# Patient Record
Sex: MALE | Race: BLACK OR AFRICAN AMERICAN | NOT HISPANIC OR LATINO | Employment: FULL TIME | RURAL
[De-identification: names, ages, dates, MRNs, and addresses within clinical notes are randomized per-mention and may not be internally consistent; named-entity substitution may affect disease eponyms.]

---

## 2022-10-18 NOTE — PROGRESS NOTES
Subjective:  Donaldo Reis is a 57 y.o. male who presents for       Patient Active Problem List   Diagnosis   • Elevated blood pressure reading   • Chronic pain of right ankle   • Right leg swelling           Current Outpatient Medications:   •  insulin glargine (Semglee) 100 UNIT/ML injection, Inject 60 Units under the skin into the appropriate area as directed Every Night., Disp: , Rfl:   •  metFORMIN (GLUCOPHAGE) 500 MG tablet, Take 1 tablet by mouth 2 (Two) Times a Day With Meals., Disp: 60 tablet, Rfl: 3    PT is 58 yo male with management of obesity, DM type 2, sp appendectomy, PTSD    10/20/22 pt is here to establish. Pt is from [place] and served in  for 13 years. He moved back recently and now works at [employer] as a cook.  [facility]     He last saw a PCP in [place] earlier this year.   Pt has a CMH of obesity and is on insulin 60 units at bedtime along with metformin 500 mg PO BID.  His last hga1c was around the 5. He does not smoke tobacco no illicit drug use. He drinks alcohol occasionally.  He is  from his wife. He has 5 children.  He has a GF. His only surgery is an appendectomy. He does suffer from PTSD while [place]. He was in combat.  He served in [place]. He is not seeing mental health. No suicidal thoughts. Family medical history includes cancer. (brother  from cancer but pt does not know what time).. he has issues with right ankle swelling and this has been going on for past . He also has right leg swellling that has been going on for more than a year. He has tried to prop his legs when sleeping. He uses compression socks       Obesity  This is a chronic problem. The current episode started more than 1 year ago. The problem occurs constantly. The problem has been unchanged. Associated symptoms include arthralgias, fatigue, numbness and weakness. Pertinent negatives include no chest pain, chills, congestion, coughing, diaphoresis, fever, headaches, nausea, sore  throat or vomiting. Nothing aggravates the symptoms. He has tried nothing for the symptoms. The treatment provided no relief.   Diabetes  He presents for his follow-up diabetic visit. He has type 2 diabetes mellitus. Pertinent negatives for hypoglycemia include no confusion, dizziness, headaches, hunger, mood changes, nervousness/anxiousness, pallor, seizures, sleepiness or sweats. Associated symptoms include fatigue and weakness. Pertinent negatives for diabetes include no chest pain. Pertinent negatives for hypoglycemia complications include no blackouts, no hospitalization, no nocturnal hypoglycemia, no required assistance and no required glucagon injection. Pertinent negatives for diabetic complications include no autonomic neuropathy, CVA, heart disease, impotence, nephropathy, peripheral neuropathy, PVD or retinopathy. Risk factors for coronary artery disease include diabetes mellitus, male sex and obesity. Current diabetic treatment includes insulin injections and oral agent (monotherapy). He is compliant with treatment most of the time. His weight is stable. He is following a generally unhealthy diet. When asked about meal planning, he reported none. He has not had a previous visit with a dietitian. An ACE inhibitor/angiotensin II receptor blocker is not being taken. He does not see a podiatrist.Eye exam is not current.   Leg Swelling  This is a recurrent problem. The current episode started more than 1 month ago. The problem occurs constantly. The problem has been unchanged. Associated symptoms include arthralgias, fatigue, numbness and weakness. Pertinent negatives include no chest pain, chills, congestion, coughing, diaphoresis, fever, headaches, nausea, sore throat or vomiting. Nothing aggravates the symptoms. He has tried nothing for the symptoms. The treatment provided no relief.   Ankle Pain   The incident occurred more than 1 week ago. The incident occurred at home. The injury mechanism was a  twisting injury. The pain is present in the right ankle. The quality of the pain is described as aching. The pain is at a severity of 4/10. The pain is moderate. Associated symptoms include numbness. Pertinent negatives include no inability to bear weight, loss of motion, loss of sensation, muscle weakness or tingling. He reports no foreign bodies present. The symptoms are aggravated by movement and palpation. He has tried nothing for the symptoms. The treatment provided no relief.       Review of Systems  Review of Systems   Constitutional: Positive for activity change and fatigue. Negative for appetite change, chills, diaphoresis and fever.   HENT: Negative for congestion, postnasal drip, rhinorrhea, sinus pressure, sinus pain, sneezing, sore throat, trouble swallowing and voice change.    Respiratory: Negative for cough, choking, chest tightness, shortness of breath, wheezing and stridor.    Cardiovascular: Negative for chest pain.   Gastrointestinal: Negative for diarrhea, nausea and vomiting.   Genitourinary: Negative for impotence.   Musculoskeletal: Positive for arthralgias.   Skin: Negative for pallor.   Neurological: Positive for weakness and numbness. Negative for dizziness, tingling, seizures and headaches.   Psychiatric/Behavioral: Negative for confusion. The patient is not nervous/anxious.        Patient Active Problem List   Diagnosis   • Elevated blood pressure reading   • Chronic pain of right ankle   • Right leg swelling     Past Surgical History:   Procedure Laterality Date   • APPENDECTOMY       Social History     Socioeconomic History   • Marital status: Legally    Tobacco Use   • Smoking status: Never   • Smokeless tobacco: Never   Vaping Use   • Vaping Use: Never used   Substance and Sexual Activity   • Alcohol use: Yes   • Drug use: Never   • Sexual activity: Defer     Family History   Problem Relation Age of Onset   • Cancer Brother      No results found for any previous visit.     "  No image results found.    [unfilled]  Immunization History   Administered Date(s) Administered   • COVID-19 (MODERNA) 1st, 2nd, 3rd Dose Only 2022   • Tdap 10/30/2018       The following portions of the patient's history were reviewed and updated as appropriate: allergies, current medications, past family history, past medical history, past social history, past surgical history and problem list.        Physical Exam  /64 (BP Location: Right arm, Patient Position: Sitting, Cuff Size: Large Adult)   Pulse 102   Temp 97.8 °F (36.6 °C)   Ht 193 cm (76\")   Wt (!) 144 kg (316 lb 12.8 oz)   SpO2 98%   BMI 38.56 kg/m²     Physical Exam  Vitals and nursing note reviewed.   Constitutional:       Appearance: He is well-developed. He is not diaphoretic.   HENT:      Head: Normocephalic and atraumatic.      Right Ear: External ear normal.   Eyes:      Conjunctiva/sclera: Conjunctivae normal.      Pupils: Pupils are equal, round, and reactive to light.   Cardiovascular:      Rate and Rhythm: Normal rate and regular rhythm.      Heart sounds: Normal heart sounds. No murmur heard.  Pulmonary:      Effort: Pulmonary effort is normal. No respiratory distress.      Breath sounds: Normal breath sounds.   Abdominal:      General: Bowel sounds are normal. There is no distension.      Palpations: Abdomen is soft.      Tenderness: There is no abdominal tenderness.      Comments: Obese abdomen    Musculoskeletal:         General: Tenderness present. No deformity.      Cervical back: Normal range of motion and neck supple.      Right lower le+ Edema present.      Right ankle: Tenderness present. Decreased range of motion.   Skin:     General: Skin is warm.      Coloration: Skin is not pale.      Findings: No erythema or rash.   Neurological:      Mental Status: He is alert and oriented to person, place, and time.      Cranial Nerves: No cranial nerve deficit.   Psychiatric:         Behavior: Behavior normal. "         [unfilled]   Diagnosis Plan   1. Chronic pain of right ankle  XR Ankle 3+ View Right      2. Encounter for screening for cardiovascular disorders  CBC Auto Differential    Comprehensive Metabolic Panel    Hemoglobin A1c    Lipid Panel    TSH    T4, Free    Vitamin D,25-Hydroxy    Vitamin B12    Hepatitis C Antibody    Microalbumin / Creatinine Urine Ratio - Urine, Clean Catch      3. Encounter for screening for endocrine disorder  CBC Auto Differential    Comprehensive Metabolic Panel    Hemoglobin A1c    Lipid Panel    TSH    T4, Free    Vitamin D,25-Hydroxy    Vitamin B12    Hepatitis C Antibody    Microalbumin / Creatinine Urine Ratio - Urine, Clean Catch      4. Encounter for screening for diabetes mellitus  CBC Auto Differential    Comprehensive Metabolic Panel    Hemoglobin A1c    Lipid Panel    TSH    T4, Free    Vitamin D,25-Hydroxy    Vitamin B12    Hepatitis C Antibody    Microalbumin / Creatinine Urine Ratio - Urine, Clean Catch      5. Uncontrolled type 2 diabetes mellitus with hypoglycemia without coma (HCC)  CBC Auto Differential    Comprehensive Metabolic Panel    Hemoglobin A1c    Lipid Panel    TSH    T4, Free    Vitamin D,25-Hydroxy    Vitamin B12    Hepatitis C Antibody    Microalbumin / Creatinine Urine Ratio - Urine, Clean Catch      6. Need for hepatitis C screening test  CBC Auto Differential    Comprehensive Metabolic Panel    Hemoglobin A1c    Lipid Panel    TSH    T4, Free    Vitamin D,25-Hydroxy    Vitamin B12    Hepatitis C Antibody    Microalbumin / Creatinine Urine Ratio - Urine, Clean Catch      7. Special screening for malignant neoplasms, colon  Cologuard - Stool, Per Rectum      8. Class 2 severe obesity with serious comorbidity and body mass index (BMI) of 38.0 to 38.9 in adult, unspecified obesity type (HCC)        9. Right leg swelling  Duplex Venous Lower Extremity - Right CAR      10. Elevated blood pressure reading             -recommend labwork  -recommend  COVID-19 vaccination  -recommend colonoscopy screening - cologuard test instead.    -recommend tdap/shingles vaccinatin  -hep C screening - hep C antibody   -elevated blood pressure readings  - monitor BP at home. Advised to bring blood pressure readings along with get a BP machine. Recommend low sodium diet   -right ankle pain/right leg swelling - will get x-ray of right ankle along with venous doppler US. Consider PARVEZ Studies in the future. Consider Orthopedic referral   -obesity  -counseled weight loss >5 minutes bMI at 38.56   -Dm type 2 - check hga1c  On insulin 60 units at bedtime and metformin 500 mg pO BID   -advised pt to be safe and call with questions and concerns  -advised pt to go to ER or call 911 if symptoms worrisome or severe  -advised pt to followup with specialist and referrals  -advised pt to be safe during COVID-19 pandemic  I spent 45 minutes caring for Donaldo on this date of service. This time includes time spent by me in the following activities: preparing for the visit, reviewing tests, obtaining and/or reviewing a separately obtained history, performing a medically appropriate examination and/or evaluation, counseling and educating the patient/family/caregiver, ordering medications, tests, or procedures, referring and communicating with other health care professionals, documenting information in the medical record, independently interpreting results and communicating that information with the patient/family/caregiver and care coordination.         This document has been electronically signed by Josh Michel MD on October 20, 2022 09:01 CDT

## 2022-10-20 ENCOUNTER — OFFICE VISIT (OUTPATIENT)
Dept: FAMILY MEDICINE CLINIC | Facility: CLINIC | Age: 57
End: 2022-10-20

## 2022-10-20 VITALS
TEMPERATURE: 97.8 F | HEIGHT: 76 IN | HEART RATE: 102 BPM | DIASTOLIC BLOOD PRESSURE: 64 MMHG | WEIGHT: 315 LBS | BODY MASS INDEX: 38.36 KG/M2 | SYSTOLIC BLOOD PRESSURE: 142 MMHG | OXYGEN SATURATION: 98 %

## 2022-10-20 DIAGNOSIS — G89.29 CHRONIC PAIN OF RIGHT ANKLE: Primary | ICD-10-CM

## 2022-10-20 DIAGNOSIS — E11.649 UNCONTROLLED TYPE 2 DIABETES MELLITUS WITH HYPOGLYCEMIA WITHOUT COMA: ICD-10-CM

## 2022-10-20 DIAGNOSIS — R03.0 ELEVATED BLOOD PRESSURE READING: ICD-10-CM

## 2022-10-20 DIAGNOSIS — Z12.11 SPECIAL SCREENING FOR MALIGNANT NEOPLASMS, COLON: ICD-10-CM

## 2022-10-20 DIAGNOSIS — Z11.59 NEED FOR HEPATITIS C SCREENING TEST: ICD-10-CM

## 2022-10-20 DIAGNOSIS — E66.01 CLASS 2 SEVERE OBESITY WITH SERIOUS COMORBIDITY AND BODY MASS INDEX (BMI) OF 38.0 TO 38.9 IN ADULT, UNSPECIFIED OBESITY TYPE: ICD-10-CM

## 2022-10-20 DIAGNOSIS — Z13.29 ENCOUNTER FOR SCREENING FOR ENDOCRINE DISORDER: ICD-10-CM

## 2022-10-20 DIAGNOSIS — M79.89 RIGHT LEG SWELLING: ICD-10-CM

## 2022-10-20 DIAGNOSIS — Z13.6 ENCOUNTER FOR SCREENING FOR CARDIOVASCULAR DISORDERS: ICD-10-CM

## 2022-10-20 DIAGNOSIS — M25.571 CHRONIC PAIN OF RIGHT ANKLE: Primary | ICD-10-CM

## 2022-10-20 DIAGNOSIS — Z13.1 ENCOUNTER FOR SCREENING FOR DIABETES MELLITUS: ICD-10-CM

## 2022-10-20 PROCEDURE — 99204 OFFICE O/P NEW MOD 45 MIN: CPT | Performed by: FAMILY MEDICINE

## 2022-10-20 RX ORDER — INSULIN GLARGINE 100 [IU]/ML
60 INJECTION, SOLUTION SUBCUTANEOUS NIGHTLY
COMMUNITY
End: 2022-11-04 | Stop reason: SDUPTHER

## 2022-10-24 ENCOUNTER — PATIENT ROUNDING (BHMG ONLY) (OUTPATIENT)
Dept: FAMILY MEDICINE CLINIC | Facility: CLINIC | Age: 57
End: 2022-10-24

## 2022-10-24 ENCOUNTER — LAB (OUTPATIENT)
Dept: LAB | Facility: HOSPITAL | Age: 57
End: 2022-10-24

## 2022-10-24 DIAGNOSIS — Z13.1 ENCOUNTER FOR SCREENING FOR DIABETES MELLITUS: ICD-10-CM

## 2022-10-24 DIAGNOSIS — E11.649 UNCONTROLLED TYPE 2 DIABETES MELLITUS WITH HYPOGLYCEMIA WITHOUT COMA: ICD-10-CM

## 2022-10-24 DIAGNOSIS — Z13.29 ENCOUNTER FOR SCREENING FOR ENDOCRINE DISORDER: ICD-10-CM

## 2022-10-24 DIAGNOSIS — Z13.6 ENCOUNTER FOR SCREENING FOR CARDIOVASCULAR DISORDERS: ICD-10-CM

## 2022-10-24 DIAGNOSIS — Z11.59 NEED FOR HEPATITIS C SCREENING TEST: ICD-10-CM

## 2022-10-24 LAB
25(OH)D3 SERPL-MCNC: 32.8 NG/ML (ref 30–100)
ALBUMIN SERPL-MCNC: 4.1 G/DL (ref 3.5–5.2)
ALBUMIN/GLOB SERPL: 1.2 G/DL
ALP SERPL-CCNC: 90 U/L (ref 39–117)
ALT SERPL W P-5'-P-CCNC: 18 U/L (ref 1–41)
ANION GAP SERPL CALCULATED.3IONS-SCNC: 10 MMOL/L (ref 5–15)
AST SERPL-CCNC: 25 U/L (ref 1–40)
BASOPHILS # BLD AUTO: 0.06 10*3/MM3 (ref 0–0.2)
BASOPHILS NFR BLD AUTO: 0.5 % (ref 0–1.5)
BILIRUB SERPL-MCNC: 0.7 MG/DL (ref 0–1.2)
BUN SERPL-MCNC: 10 MG/DL (ref 6–20)
BUN/CREAT SERPL: 12.2 (ref 7–25)
CALCIUM SPEC-SCNC: 9.4 MG/DL (ref 8.6–10.5)
CHLORIDE SERPL-SCNC: 107 MMOL/L (ref 98–107)
CHOLEST SERPL-MCNC: 169 MG/DL (ref 0–200)
CO2 SERPL-SCNC: 23 MMOL/L (ref 22–29)
CREAT SERPL-MCNC: 0.82 MG/DL (ref 0.76–1.27)
DEPRECATED RDW RBC AUTO: 39.9 FL (ref 37–54)
EGFRCR SERPLBLD CKD-EPI 2021: 101.8 ML/MIN/1.73
EOSINOPHIL # BLD AUTO: 0.28 10*3/MM3 (ref 0–0.4)
EOSINOPHIL NFR BLD AUTO: 2.2 % (ref 0.3–6.2)
ERYTHROCYTE [DISTWIDTH] IN BLOOD BY AUTOMATED COUNT: 14.5 % (ref 12.3–15.4)
GLOBULIN UR ELPH-MCNC: 3.3 GM/DL
GLUCOSE SERPL-MCNC: 54 MG/DL (ref 65–99)
HBA1C MFR BLD: 5.8 % (ref 4.8–5.6)
HCT VFR BLD AUTO: 43 % (ref 37.5–51)
HCV AB SER DONR QL: NORMAL
HDLC SERPL-MCNC: 44 MG/DL (ref 40–60)
HGB BLD-MCNC: 14.1 G/DL (ref 13–17.7)
LDLC SERPL CALC-MCNC: 106 MG/DL (ref 0–100)
LDLC/HDLC SERPL: 2.38 {RATIO}
LYMPHOCYTES # BLD AUTO: 6.09 10*3/MM3 (ref 0.7–3.1)
LYMPHOCYTES NFR BLD AUTO: 48.1 % (ref 19.6–45.3)
MCH RBC QN AUTO: 25.1 PG (ref 26.6–33)
MCHC RBC AUTO-ENTMCNC: 32.8 G/DL (ref 31.5–35.7)
MCV RBC AUTO: 76.6 FL (ref 79–97)
MONOCYTES # BLD AUTO: 0.78 10*3/MM3 (ref 0.1–0.9)
MONOCYTES NFR BLD AUTO: 6.2 % (ref 5–12)
NEUTROPHILS NFR BLD AUTO: 42.7 % (ref 42.7–76)
NEUTROPHILS NFR BLD AUTO: 5.41 10*3/MM3 (ref 1.7–7)
PLATELET # BLD AUTO: 322 10*3/MM3 (ref 140–450)
PMV BLD AUTO: 10.3 FL (ref 6–12)
POTASSIUM SERPL-SCNC: 3.2 MMOL/L (ref 3.5–5.2)
PROT SERPL-MCNC: 7.4 G/DL (ref 6–8.5)
RBC # BLD AUTO: 5.61 10*6/MM3 (ref 4.14–5.8)
SODIUM SERPL-SCNC: 140 MMOL/L (ref 136–145)
T4 FREE SERPL-MCNC: 1.31 NG/DL (ref 0.93–1.7)
TRIGL SERPL-MCNC: 101 MG/DL (ref 0–150)
TSH SERPL DL<=0.05 MIU/L-ACNC: 1.32 UIU/ML (ref 0.27–4.2)
VIT B12 BLD-MCNC: 528 PG/ML (ref 211–946)
VLDLC SERPL-MCNC: 19 MG/DL (ref 5–40)
WBC NRBC COR # BLD: 12.66 10*3/MM3 (ref 3.4–10.8)

## 2022-10-24 PROCEDURE — 84439 ASSAY OF FREE THYROXINE: CPT

## 2022-10-24 PROCEDURE — 84443 ASSAY THYROID STIM HORMONE: CPT

## 2022-10-24 PROCEDURE — 85025 COMPLETE CBC W/AUTO DIFF WBC: CPT

## 2022-10-24 PROCEDURE — 82607 VITAMIN B-12: CPT

## 2022-10-24 PROCEDURE — 36415 COLL VENOUS BLD VENIPUNCTURE: CPT

## 2022-10-24 PROCEDURE — 82306 VITAMIN D 25 HYDROXY: CPT

## 2022-10-24 PROCEDURE — 83036 HEMOGLOBIN GLYCOSYLATED A1C: CPT

## 2022-10-24 PROCEDURE — 80053 COMPREHEN METABOLIC PANEL: CPT

## 2022-10-24 PROCEDURE — 86803 HEPATITIS C AB TEST: CPT

## 2022-10-24 PROCEDURE — 80061 LIPID PANEL: CPT

## 2022-10-24 NOTE — PROGRESS NOTES
October 24, 2022    Hello, may I speak with Donaldo Reis?    My name is AISHWARYA    I am  with [facility] - [address] [place] [address] [phone].    Before we get started may I verify your date of birth? 1965    I am calling to officially welcome you to our practice and ask about your recent visit. Is this a good time to talk?  CALLED NO ANSWER LEFT MESSAGE      Tell me about your visit with us. What things went well?        We're always looking for ways to make our patients' experiences even better. Do you have recommendations on ways we may improve?     Overall were you satisfied with your first visit to our practice?        I appreciate you taking the time to speak with me today. Is there anything else I can do for you?       Thank you, and have a great day.

## 2022-10-26 ENCOUNTER — LAB (OUTPATIENT)
Dept: LAB | Facility: HOSPITAL | Age: 57
End: 2022-10-26

## 2022-10-26 DIAGNOSIS — Z13.29 ENCOUNTER FOR SCREENING FOR ENDOCRINE DISORDER: ICD-10-CM

## 2022-10-26 DIAGNOSIS — Z13.1 ENCOUNTER FOR SCREENING FOR DIABETES MELLITUS: ICD-10-CM

## 2022-10-26 DIAGNOSIS — E11.649 UNCONTROLLED TYPE 2 DIABETES MELLITUS WITH HYPOGLYCEMIA WITHOUT COMA: ICD-10-CM

## 2022-10-26 DIAGNOSIS — Z11.59 NEED FOR HEPATITIS C SCREENING TEST: ICD-10-CM

## 2022-10-26 DIAGNOSIS — Z13.6 ENCOUNTER FOR SCREENING FOR CARDIOVASCULAR DISORDERS: ICD-10-CM

## 2022-10-26 PROCEDURE — 82043 UR ALBUMIN QUANTITATIVE: CPT

## 2022-10-26 PROCEDURE — 82570 ASSAY OF URINE CREATININE: CPT

## 2022-10-27 LAB
ALBUMIN UR-MCNC: <1.2 MG/DL
CREAT UR-MCNC: 111 MG/DL
MICROALBUMIN/CREAT UR: NORMAL MG/G{CREAT}

## 2022-11-02 ENCOUNTER — TELEPHONE (OUTPATIENT)
Dept: FAMILY MEDICINE CLINIC | Facility: CLINIC | Age: 57
End: 2022-11-02

## 2022-11-02 NOTE — TELEPHONE ENCOUNTER
----- Message from Josh Michel MD sent at 10/25/2022 11:32 AM CDT -----  Please call pt    On CBC WBC count at 12.66 and will need to monitor.  May be reactive    MCV low to suggest pt has small red blood cells. Possible iron deficiency anemia    Lipid panel shows LDL at 106 and recommend pt start on lipitor 20 mg PO qhs give 30 pills and 3 refills. Will help lower risk of cardiovascular disease    Hga1c stable at 5.80 continue good work with sugar control.      On CMP glucose low at 54. Have pt lower his in sulin to 55 unites from 60 units at bedtime. Recommend pt get glucose tabs OTC to help with hypoglycemic episodes    Potassium low at 3.2 and recommend pt start on potassium chloride K-DUR 20 MEQ PO BID for 7 days please give 14 pills and no refills. Recommend high potassium foods like bananas     Kidney and liver function stable    Recheck on next visit thanks

## 2022-11-02 NOTE — TELEPHONE ENCOUNTER
----- Message from Josh Michel MD sent at 10/28/2022  9:49 AM CDT -----  Please let pt know x-ray of right ankle shows old areas of avulsions/fracture of the middle portion of ankle  There is small bone spur in distal tibia area    Also has moderate sized heel spurs  Along with degenerative changes of the 5th metatarsal of 5th toe    Recommend podiatry referral and if pt agreeable I will put in referral  Recheck on next visit thanks

## 2022-11-02 NOTE — TELEPHONE ENCOUNTER
Gave pt results and recommendations. He voiced understanding and is agreeable to referral to Podiatry but would like to wait on medications and discuss at upcoming appointment.

## 2022-11-03 DIAGNOSIS — M25.571 RIGHT ANKLE PAIN, UNSPECIFIED CHRONICITY: Primary | ICD-10-CM

## 2022-11-03 DIAGNOSIS — M79.671 RIGHT FOOT PAIN: ICD-10-CM

## 2022-11-04 ENCOUNTER — OFFICE VISIT (OUTPATIENT)
Dept: FAMILY MEDICINE CLINIC | Facility: CLINIC | Age: 57
End: 2022-11-04

## 2022-11-04 VITALS
OXYGEN SATURATION: 98 % | SYSTOLIC BLOOD PRESSURE: 150 MMHG | HEART RATE: 78 BPM | BODY MASS INDEX: 38.36 KG/M2 | DIASTOLIC BLOOD PRESSURE: 88 MMHG | WEIGHT: 315 LBS | HEIGHT: 76 IN | TEMPERATURE: 97.3 F

## 2022-11-04 DIAGNOSIS — E11.8 CONTROLLED TYPE 2 DIABETES MELLITUS WITH COMPLICATION, WITH LONG-TERM CURRENT USE OF INSULIN: ICD-10-CM

## 2022-11-04 DIAGNOSIS — E66.01 CLASS 2 SEVERE OBESITY DUE TO EXCESS CALORIES WITH SERIOUS COMORBIDITY AND BODY MASS INDEX (BMI) OF 38.0 TO 38.9 IN ADULT: ICD-10-CM

## 2022-11-04 DIAGNOSIS — Z79.4 CONTROLLED TYPE 2 DIABETES MELLITUS WITH COMPLICATION, WITH LONG-TERM CURRENT USE OF INSULIN: ICD-10-CM

## 2022-11-04 DIAGNOSIS — M19.071 ARTHRITIS OF RIGHT ANKLE: ICD-10-CM

## 2022-11-04 DIAGNOSIS — S82.891G CLOSED AVULSION FRACTURE OF RIGHT ANKLE WITH DELAYED HEALING, SUBSEQUENT ENCOUNTER: ICD-10-CM

## 2022-11-04 DIAGNOSIS — G89.29 CHRONIC PAIN OF RIGHT ANKLE: Primary | ICD-10-CM

## 2022-11-04 DIAGNOSIS — E78.2 MIXED HYPERLIPIDEMIA: ICD-10-CM

## 2022-11-04 DIAGNOSIS — D72.829 LEUKOCYTOSIS, UNSPECIFIED TYPE: ICD-10-CM

## 2022-11-04 DIAGNOSIS — M25.571 CHRONIC PAIN OF RIGHT ANKLE: Primary | ICD-10-CM

## 2022-11-04 PROBLEM — S82.899G: Status: ACTIVE | Noted: 2022-11-04

## 2022-11-04 PROCEDURE — 99214 OFFICE O/P EST MOD 30 MIN: CPT | Performed by: FAMILY MEDICINE

## 2022-11-04 RX ORDER — INSULIN GLARGINE 300 U/ML
55 INJECTION, SOLUTION SUBCUTANEOUS DAILY
Qty: 9 ML | Refills: 3 | Status: SHIPPED | OUTPATIENT
Start: 2022-11-04 | End: 2022-11-09 | Stop reason: CLARIF

## 2022-11-04 RX ORDER — ATORVASTATIN CALCIUM 20 MG/1
20 TABLET, FILM COATED ORAL DAILY
Qty: 90 TABLET | Refills: 3 | Status: SHIPPED | OUTPATIENT
Start: 2022-11-04 | End: 2022-11-22 | Stop reason: SDUPTHER

## 2022-11-04 RX ORDER — POTASSIUM CHLORIDE 750 MG/1
10 TABLET, FILM COATED, EXTENDED RELEASE ORAL 2 TIMES DAILY
Qty: 14 TABLET | Refills: 0 | Status: SHIPPED | OUTPATIENT
Start: 2022-11-04 | End: 2022-11-11

## 2022-11-04 RX ORDER — INSULIN GLARGINE 100 [IU]/ML
55 INJECTION, SOLUTION SUBCUTANEOUS NIGHTLY
Qty: 9 ML | Refills: 3 | Status: SHIPPED | OUTPATIENT
Start: 2022-11-04 | End: 2022-11-04

## 2022-11-04 RX ORDER — LOSARTAN POTASSIUM 50 MG/1
50 TABLET ORAL DAILY
Qty: 30 TABLET | Refills: 3 | Status: SHIPPED | OUTPATIENT
Start: 2022-11-04 | End: 2022-11-22 | Stop reason: SDUPTHER

## 2022-11-04 NOTE — PATIENT INSTRUCTIONS
Try 50-55 units of insulin at bedtime instead of 60  switch from lantus to toujeo insulin.     New medications    Lipitor 20 mg at beditme for cholesteorl    Losartan 50 mg daily for blood pressure.  Please get blood pressure machined and bring readings on next visit goal <130/90    Can try trulicity 0.75 mg injection weekly. 1 if approved stop insulin and try this medication instead     If not approved continue with insulin.      Recheck in 6-8 weeks

## 2022-11-08 NOTE — TELEPHONE ENCOUNTER
Alley from [facility] pharmacy called in regards to patient's glargine. She said typically patient gets the vials instead of pens so they wanted to clarify before they filled prescription.  Call back number: [phone]

## 2022-11-09 NOTE — TELEPHONE ENCOUNTER
Josh Michel MD  You 41 minutes ago (6:59 AM)     Please change medication to Lantus and I will sign. Thank you

## 2022-11-22 RX ORDER — ATORVASTATIN CALCIUM 20 MG/1
20 TABLET, FILM COATED ORAL DAILY
Qty: 90 TABLET | Refills: 0 | Status: SHIPPED | OUTPATIENT
Start: 2022-11-22 | End: 2023-02-22 | Stop reason: SDUPTHER

## 2022-11-22 RX ORDER — LOSARTAN POTASSIUM 50 MG/1
50 TABLET ORAL DAILY
Qty: 90 TABLET | Refills: 0 | Status: SHIPPED | OUTPATIENT
Start: 2022-11-22 | End: 2023-02-22 | Stop reason: SDUPTHER

## 2022-11-22 NOTE — TELEPHONE ENCOUNTER
Rx Refill Note  Requested Prescriptions     Pending Prescriptions Disp Refills   • Insulin Glargine (LANTUS SOLOSTAR) 100 UNIT/ML injection pen 15 mL 3     Sig: Inject 55 Units under the skin into the appropriate area as directed Daily.   • atorvastatin (Lipitor) 20 MG tablet 90 tablet 0     Sig: Take 1 tablet by mouth Daily.   • losartan (Cozaar) 50 MG tablet 30 tablet 3     Sig: Take 1 tablet by mouth Daily.   • Insulin Pen Needle 32G X 8 MM misc 100 each 3     Si application Daily.      Last office visit with prescribing clinician: 2022      Next office visit with prescribing clinician: 2022   {TIP  Encounters:       ARB Protocol Failed 2022 08:22 AM   Protocol Details  Normal serum potassium on file within the past year    BP under 130/80 in past year and patient has diabetes, CAD or PVD            {TIP  Please add Last Relevant Lab Date if appropriate  {TIP  Is Refill Pharmacy correct? yes  Glenn Rosas LPN  22, 09:02 CST

## 2022-11-22 NOTE — TELEPHONE ENCOUNTER
PLEASE CALL PATIENT REGARDING THESE REFILLS. THE PHARMACY SAID THAT HE CANT GET ANY UNTIL NEXT MONTH AND [facility] PAT[facility]ENTS THE PHONE NUMBER [phone]

## 2023-02-19 PROBLEM — I10 ESSENTIAL HYPERTENSION: Status: ACTIVE | Noted: 2023-02-19

## 2023-02-22 ENCOUNTER — OFFICE VISIT (OUTPATIENT)
Dept: FAMILY MEDICINE CLINIC | Facility: CLINIC | Age: 58
End: 2023-02-22
Payer: OTHER GOVERNMENT

## 2023-02-22 VITALS
OXYGEN SATURATION: 97 % | DIASTOLIC BLOOD PRESSURE: 72 MMHG | HEIGHT: 76 IN | WEIGHT: 315 LBS | SYSTOLIC BLOOD PRESSURE: 134 MMHG | HEART RATE: 78 BPM | BODY MASS INDEX: 38.36 KG/M2 | TEMPERATURE: 97.9 F

## 2023-02-22 DIAGNOSIS — E11.9 CONTROLLED TYPE 2 DIABETES MELLITUS WITHOUT COMPLICATION, WITHOUT LONG-TERM CURRENT USE OF INSULIN: Primary | ICD-10-CM

## 2023-02-22 DIAGNOSIS — E66.01 CLASS 2 SEVERE OBESITY WITH SERIOUS COMORBIDITY AND BODY MASS INDEX (BMI) OF 39.0 TO 39.9 IN ADULT, UNSPECIFIED OBESITY TYPE: ICD-10-CM

## 2023-02-22 DIAGNOSIS — I10 ESSENTIAL HYPERTENSION: ICD-10-CM

## 2023-02-22 DIAGNOSIS — E78.2 MIXED HYPERLIPIDEMIA: ICD-10-CM

## 2023-02-22 PROCEDURE — 99214 OFFICE O/P EST MOD 30 MIN: CPT | Performed by: FAMILY MEDICINE

## 2023-02-22 RX ORDER — INSULIN GLARGINE-YFGN 100 [IU]/ML
55 INJECTION, SOLUTION SUBCUTANEOUS DAILY
Qty: 9 ML | Refills: 12 | Status: SHIPPED | OUTPATIENT
Start: 2023-02-22 | End: 2023-03-10 | Stop reason: SDUPTHER

## 2023-02-22 RX ORDER — INSULIN GLARGINE-YFGN 100 [IU]/ML
55 INJECTION, SOLUTION SUBCUTANEOUS DAILY
Qty: 9 ML | Refills: 12 | Status: SHIPPED | OUTPATIENT
Start: 2023-02-22 | End: 2023-02-22 | Stop reason: SDUPTHER

## 2023-02-22 RX ORDER — LOSARTAN POTASSIUM 50 MG/1
50 TABLET ORAL DAILY
Qty: 90 TABLET | Refills: 0 | Status: SHIPPED | OUTPATIENT
Start: 2023-02-22 | End: 2023-02-22 | Stop reason: SDUPTHER

## 2023-02-22 RX ORDER — LOSARTAN POTASSIUM 50 MG/1
50 TABLET ORAL DAILY
Qty: 90 TABLET | Refills: 1 | Status: SHIPPED | OUTPATIENT
Start: 2023-02-22

## 2023-02-22 RX ORDER — ATORVASTATIN CALCIUM 20 MG/1
20 TABLET, FILM COATED ORAL DAILY
Qty: 90 TABLET | Refills: 1 | Status: SHIPPED | OUTPATIENT
Start: 2023-02-22

## 2023-02-22 RX ORDER — ATORVASTATIN CALCIUM 20 MG/1
20 TABLET, FILM COATED ORAL DAILY
Qty: 90 TABLET | Refills: 0 | Status: SHIPPED | OUTPATIENT
Start: 2023-02-22 | End: 2023-02-22 | Stop reason: SDUPTHER

## 2023-03-09 PROBLEM — E11.9 CONTROLLED TYPE 2 DIABETES MELLITUS WITHOUT COMPLICATION, WITHOUT LONG-TERM CURRENT USE OF INSULIN: Status: ACTIVE | Noted: 2023-03-09

## 2023-03-10 RX ORDER — INSULIN GLARGINE-YFGN 100 [IU]/ML
55 INJECTION, SOLUTION SUBCUTANEOUS DAILY
Qty: 9 ML | Refills: 1 | Status: SHIPPED | OUTPATIENT
Start: 2023-03-10 | End: 2023-03-17 | Stop reason: CLARIF

## 2023-03-17 NOTE — TELEPHONE ENCOUNTER
Pt stated he received vials of insulin and he will not use those. He is requesting that pens be sent in to the [facility] pharmacy. He stated Lantus was his previous insulin before Semglee.

## 2023-04-12 NOTE — PROGRESS NOTES
Subjective:  Donaldo Reis is a 58 y.o. male who presents for       Patient Active Problem List   Diagnosis   • Elevated blood pressure reading   • Chronic pain of right ankle   • Right leg swelling   • Arthritis of right ankle   • Closed avulsion fracture of ankle with delayed healing   • Controlled type 2 diabetes mellitus with complication, with long-term current use of insulin   • Mixed hyperlipidemia   • Leukocytosis   • Essential hypertension   • Class 2 severe obesity with serious comorbidity and body mass index (BMI) of 39.0 to 39.9 in adult   • Controlled type 2 diabetes mellitus without complication, without long-term current use of insulin           Current Outpatient Medications:   •  atorvastatin (Lipitor) 20 MG tablet, Take 1 tablet by mouth Daily., Disp: 90 tablet, Rfl: 1  •  Insulin Glargine (LANTUS SOLOSTAR) 100 UNIT/ML injection pen, Inject 55 Units under the skin into the appropriate area as directed Daily., Disp: 9 mL, Rfl: 3  •  Insulin Pen Needle 32G X 8 MM misc, 1 application Daily., Disp: 100 each, Rfl: 12  •  losartan (Cozaar) 50 MG tablet, Take 1 tablet by mouth Daily., Disp: 90 tablet, Rfl: 1  •  metFORMIN (GLUCOPHAGE) 500 MG tablet, Take 1 tablet by mouth 2 (Two) Times a Day With Meals., Disp: 180 tablet, Rfl: 1  •  Semaglutide-Weight Management (Wegovy) 0.25 MG/0.5ML solution auto-injector, Inject 0.25 mg under the skin into the appropriate area as directed 1 (One) Time Per Week., Disp: 2 mL, Rfl: 3       PT is 56 yo male with management of obesity, DM type 2, sp appendectomy, PTSD, HLP, HTN     11/4/22 in office visit for recheck. Pt had labwork done on 10/24/22 that showed stable thyroid studies. Vitamin D and b12 stable lipid panel showed LDL at 106. hga1c at 5.80. CMP showed potassium at 3.2 glucose at 5. CBC Showed WBC at 12.66 with elevations of lymphocytes.  Hemoglobin stable but MCV low at 76.  Pt was advised to cut back on his insulin from 60 to 55 units at bedtime. Pt had  x-ray of ankle on 10/26/22 that showed 8 mm old avulsions of tip of medial malleolus, along with small spur at posterior aspect of distal tibia and moderate sized calcaneal spurs.  There is minimal degenerative changes of base of 5th metatarsal. Pt was referred to Podiatry.  He states he is doing fair.  Has athlete's foot on left foot and has tried OTC medications with no relief. It is burning     2/22/23 in office visit for recheck. Pt is due for new labwork, BP better controlled with losartan 50 mg daily. Pt is doing well no chest pain no dizziness. He continues to take long acting insulin 55 units at bedtime.       5/17/23 in office visit for recheck. Pt has yet to get labwork ordered on 219/23 .he has yet to complete cologuard      Hypertension  This is a chronic problem. The current episode started more than 1 year ago. The problem has been gradually improving since onset. The problem is uncontrolled. Pertinent negatives include no chest pain, headaches or shortness of breath. Past treatments include angiotensin blockers. Current antihypertension treatment includes angiotensin blockers. The current treatment provides no improvement. There are no compliance problems.  There is no history of angina, kidney disease, CAD/MI, CVA, heart failure, left ventricular hypertrophy, PVD or retinopathy. There is no history of chronic renal disease, coarctation of the aorta, hyperaldosteronism, a hypertension causing med, pheochromocytoma or renovascular disease.   Obesity  This is a chronic problem. The current episode started more than 1 year ago. The problem occurs constantly. The problem has been unchanged. Associated symptoms include arthralgias, fatigue, numbness and weakness. Pertinent negatives include no chest pain, chills, congestion, coughing, diaphoresis, fever, headaches, nausea, sore throat or vomiting. Nothing aggravates the symptoms. He has tried nothing for the symptoms. The treatment provided no relief.    Diabetes  He presents for his follow-up diabetic visit. He has type 2 diabetes mellitus. Pertinent negatives for hypoglycemia include no confusion, dizziness, headaches, hunger, mood changes, nervousness/anxiousness, pallor, seizures, sleepiness or sweats. Associated symptoms include fatigue and weakness. Pertinent negatives for diabetes include no chest pain. Pertinent negatives for hypoglycemia complications include no blackouts, no hospitalization, no nocturnal hypoglycemia, no required assistance and no required glucagon injection. Pertinent negatives for diabetic complications include no autonomic neuropathy, CVA, heart disease, impotence, nephropathy, peripheral neuropathy, PVD or retinopathy. Risk factors for coronary artery disease include diabetes mellitus, male sex and obesity. Current diabetic treatment includes insulin injections and oral agent (monotherapy). He is compliant with treatment most of the time. His weight is stable. He is following a generally unhealthy diet. When asked about meal planning, he reported none. He has not had a previous visit with a dietitian. An ACE inhibitor/angiotensin II receptor blocker is not being taken. He does not see a podiatrist.Eye exam is not current.   Leg Swelling  This is a recurrent problem. The current episode started more than 1 month ago. The problem occurs constantly. The problem has been unchanged. Associated symptoms include arthralgias, fatigue, numbness and weakness. Pertinent negatives include no chest pain, chills, congestion, coughing, diaphoresis, fever, headaches, nausea, sore throat or vomiting. Nothing aggravates the symptoms. He has tried nothing for the symptoms. The treatment provided no relief.   Ankle Pain   The incident occurred more than 1 week ago. The incident occurred at home. The injury mechanism was a twisting injury. The pain is present in the right ankle. The quality of the pain is described as aching. The pain is at a severity of  4/10. The pain is moderate. Associated symptoms include numbness. Pertinent negatives include no inability to bear weight, loss of motion, loss of sensation, muscle weakness or tingling. He reports no foreign bodies present. The symptoms are aggravated by movement and palpation. He has tried nothing for the symptoms. The treatment provided no relief.       Review of Systems  Review of Systems   Constitutional: Positive for activity change and fatigue. Negative for appetite change, chills, diaphoresis and fever.   HENT: Negative for congestion, postnasal drip, rhinorrhea, sinus pressure, sinus pain, sneezing, sore throat, trouble swallowing and voice change.    Respiratory: Negative for cough, choking, chest tightness, shortness of breath, wheezing and stridor.    Cardiovascular: Negative for chest pain.   Gastrointestinal: Negative for diarrhea, nausea and vomiting.   Musculoskeletal: Positive for arthralgias. Negative for back pain.   Neurological: Positive for weakness and numbness. Negative for headaches.       Patient Active Problem List   Diagnosis   • Elevated blood pressure reading   • Chronic pain of right ankle   • Right leg swelling   • Arthritis of right ankle   • Closed avulsion fracture of ankle with delayed healing   • Controlled type 2 diabetes mellitus with complication, with long-term current use of insulin   • Mixed hyperlipidemia   • Leukocytosis   • Essential hypertension   • Class 2 severe obesity with serious comorbidity and body mass index (BMI) of 39.0 to 39.9 in adult   • Controlled type 2 diabetes mellitus without complication, without long-term current use of insulin     Past Surgical History:   Procedure Laterality Date   • APPENDECTOMY       Social History     Socioeconomic History   • Marital status: Legally    Tobacco Use   • Smoking status: Never   • Smokeless tobacco: Never   Vaping Use   • Vaping Use: Never used   Substance and Sexual Activity   • Alcohol use: Yes   • Drug  "use: Never   • Sexual activity: Defer     Family History   Problem Relation Age of Onset   • Cancer Brother      No visits with results within 6 Month(s) from this visit.   Latest known visit with results is:   Lab on 10/26/2022   Component Date Value Ref Range Status   • Microalbumin/Creatinine Ratio 10/26/2022    Final    Unable to calculate   • Creatinine, Urine 10/26/2022 111.0  mg/dL Final   • Microalbumin, Urine 10/26/2022 <1.2  mg/dL Final      XR Ankle 3+ View Right  Narrative: Three view right ankle    HISTORY: Pain    AP, lateral and oblique views obtained.    COMPARISON: None    FINDINGS:   No acute fracture or dislocation.  8 mm and smaller old avulsions tip of the medial malleolus.  Small spur posterior aspect distal tibia.  Moderate-sized calcaneal spurs.  Minimal degenerative change base of the fifth metatarsal.  No other osseous or articular abnormality.  Impression: CONCLUSION:  8 mm and smaller old avulsions tip of the medial malleolus.  Small spur posterior aspect distal tibia.  Moderate-sized calcaneal spurs.  Minimal degenerative change base of the fifth metatarsal.    [id number]    Electronically signed by:  Art Agrawal MD  10/27/2022 10:50 PM  CDT Workstation: [id number]    @[email or web address]@  Immunization History   Administered Date(s) Administered   • COVID-19 (MODERNA) 1st,2nd,3rd Dose Monovalent 04/18/2022   • Tdap 10/30/2018       The following portions of the patient's history were reviewed and updated as appropriate: allergies, current medications, past family history, past medical history, past social history, past surgical history and problem list.        Physical Exam  /84 (BP Location: Left arm, Patient Position: Sitting, Cuff Size: Large Adult)   Pulse 74   Temp 98.3 °F (36.8 °C)   Ht 193 cm (76\")   Wt (!) 146 kg (322 lb 6.4 oz)   SpO2 97%   BMI 39.24 kg/m²         Physical Exam  Vitals and nursing note reviewed.   Constitutional:       Appearance: He is well-developed. He is not " diaphoretic.   HENT:      Head: Normocephalic and atraumatic.      Right Ear: External ear normal.   Eyes:      Conjunctiva/sclera: Conjunctivae normal.      Pupils: Pupils are equal, round, and reactive to light.   Cardiovascular:      Rate and Rhythm: Normal rate and regular rhythm.      Heart sounds: Normal heart sounds. No murmur heard.  Pulmonary:      Effort: Pulmonary effort is normal. No respiratory distress.      Breath sounds: Normal breath sounds.   Abdominal:      General: Bowel sounds are normal. There is no distension.      Palpations: Abdomen is soft.      Tenderness: There is no abdominal tenderness.      Comments: Obese abdomen    Musculoskeletal:         General: Tenderness present. No deformity. Normal range of motion.      Cervical back: Normal range of motion and neck supple.   Skin:     General: Skin is warm.      Coloration: Skin is not pale.      Findings: No erythema or rash.   Neurological:      Mental Status: He is alert and oriented to person, place, and time.      Cranial Nerves: No cranial nerve deficit.   Psychiatric:         Behavior: Behavior normal.         [unfilled]   Diagnosis Plan   1. Encounter for diabetes type 2 eye exam  Ambulatory Referral for Diabetic Eye Exam-Optometry      2. Essential hypertension        3. Mixed hyperlipidemia        4. Controlled type 2 diabetes mellitus without complication, without long-term current use of insulin        5. Leukocytosis, unspecified type        6. Class 2 severe obesity with serious comorbidity and body mass index (BMI) of 39.0 to 39.9 in adult, unspecified obesity type        7. Special screening for malignant neoplasms, colon  Cologuard - Stool, Per Rectum           -recommend labwork  -recommend COVID-19 vaccination  -recommend diabetic eye exam- -refer to DR. Hernandes.    -recommend colonoscopy screening - will get cologuard.    -HTN - on losartan BP better controlled   -leukocytosis - continue to monitor   -HLP - -recommend  heart healthy diet. on lipitor 20 mg PO qhs. Drug information provided.   -right ankle pain/right leg swelling/old avulsion fracture of right ankle. Arthritis of right ankle  - reviewed x-ray of ankle. Referred to Podiatry  -obesity  -counseled weight loss >5 minutes bMI at 39.24  Start on wegovy injection 0.25 mg weekly.   -Dm type 2 -  On Latnus  55 units at bedtime   and metformin 500 mg pO BID.  May need to adjust insulin   -advised pt to be safe and call with questions and concerns  -advised pt to go to ER or call 911 if symptoms worrisome or severe  -advised pt to followup with specialist and referrals  -advised pt to be safe during COVID-19 pandemic  I spent 36  minutes caring for Donaldo on this date of service. This time includes time spent by me in the following activities: preparing for the visit, reviewing tests, obtaining and/or reviewing a separately obtained history, performing a medically appropriate examination and/or evaluation, counseling and educating the patient/family/caregiver, ordering medications, tests, or procedures, referring and communicating with other health care professionals, documenting information in the medical record, independently interpreting results and communicating that information with the patient/family/caregiver and care coordination.   -recheck in 4-6 weeks         This document has been electronically signed by Josh Michel MD on May 17, 2023 08:48 CDT

## 2023-04-20 ENCOUNTER — TELEPHONE (OUTPATIENT)
Dept: FAMILY MEDICINE CLINIC | Facility: CLINIC | Age: 58
End: 2023-04-20
Payer: OTHER GOVERNMENT

## 2023-04-20 NOTE — TELEPHONE ENCOUNTER
Patient called back needing his insulin to be sent to [facility]. He is also requesting a 90 day supply. He states he is also on his last pen.    Call back number [phone]

## 2023-04-20 NOTE — TELEPHONE ENCOUNTER
Patient called and would like a return call regarding his insulin prescription that will run out before he returns home

## 2023-05-17 ENCOUNTER — OFFICE VISIT (OUTPATIENT)
Dept: FAMILY MEDICINE CLINIC | Facility: CLINIC | Age: 58
End: 2023-05-17
Payer: OTHER GOVERNMENT

## 2023-05-17 VITALS
WEIGHT: 315 LBS | HEART RATE: 74 BPM | TEMPERATURE: 98.3 F | HEIGHT: 76 IN | BODY MASS INDEX: 38.36 KG/M2 | SYSTOLIC BLOOD PRESSURE: 130 MMHG | OXYGEN SATURATION: 97 % | DIASTOLIC BLOOD PRESSURE: 84 MMHG

## 2023-05-17 DIAGNOSIS — E11.9 ENCOUNTER FOR DIABETES TYPE 2 EYE EXAM: Primary | ICD-10-CM

## 2023-05-17 DIAGNOSIS — I10 ESSENTIAL HYPERTENSION: ICD-10-CM

## 2023-05-17 DIAGNOSIS — D72.829 LEUKOCYTOSIS, UNSPECIFIED TYPE: ICD-10-CM

## 2023-05-17 DIAGNOSIS — Z12.11 SPECIAL SCREENING FOR MALIGNANT NEOPLASMS, COLON: ICD-10-CM

## 2023-05-17 DIAGNOSIS — E11.9 CONTROLLED TYPE 2 DIABETES MELLITUS WITHOUT COMPLICATION, WITHOUT LONG-TERM CURRENT USE OF INSULIN: ICD-10-CM

## 2023-05-17 DIAGNOSIS — E66.01 CLASS 2 SEVERE OBESITY WITH SERIOUS COMORBIDITY AND BODY MASS INDEX (BMI) OF 39.0 TO 39.9 IN ADULT, UNSPECIFIED OBESITY TYPE: ICD-10-CM

## 2023-05-17 DIAGNOSIS — Z01.00 ENCOUNTER FOR DIABETES TYPE 2 EYE EXAM: Primary | ICD-10-CM

## 2023-05-17 DIAGNOSIS — E78.2 MIXED HYPERLIPIDEMIA: ICD-10-CM

## 2023-05-17 RX ORDER — SEMAGLUTIDE 0.25 MG/.5ML
0.25 INJECTION, SOLUTION SUBCUTANEOUS WEEKLY
Qty: 2 ML | Refills: 3 | Status: SHIPPED | OUTPATIENT
Start: 2023-05-17

## 2023-05-17 NOTE — PATIENT INSTRUCTIONS
Recheck in 4-6 weeks    Labwork    Reordered cologuard    Start on wegovy injection 0.25 mg weekly.      Refer to DR. Hernandes for eye exam

## 2023-05-24 ENCOUNTER — TELEPHONE (OUTPATIENT)
Dept: FAMILY MEDICINE CLINIC | Facility: CLINIC | Age: 58
End: 2023-05-24
Payer: OTHER GOVERNMENT

## 2023-05-24 RX ORDER — SEMAGLUTIDE 0.25 MG/.5ML
0.25 INJECTION, SOLUTION SUBCUTANEOUS WEEKLY
Qty: 2 ML | Refills: 3 | Status: CANCELLED | OUTPATIENT
Start: 2023-05-24

## 2023-05-24 NOTE — TELEPHONE ENCOUNTER
Patient called asking the status of his prescription for Wegovy states that has contacted pharmacy and they state have not received; patient would like a return call

## 2023-05-25 RX ORDER — SEMAGLUTIDE 0.25 MG/.5ML
0.25 INJECTION, SOLUTION SUBCUTANEOUS WEEKLY
Qty: 2 ML | Refills: 3 | Status: SHIPPED | OUTPATIENT
Start: 2023-05-25 | End: 2023-05-26 | Stop reason: SDUPTHER

## 2023-05-26 RX ORDER — SEMAGLUTIDE 0.25 MG/.5ML
0.25 INJECTION, SOLUTION SUBCUTANEOUS WEEKLY
Qty: 2 ML | Refills: 3 | Status: SHIPPED | OUTPATIENT
Start: 2023-05-26

## 2023-05-26 NOTE — TELEPHONE ENCOUNTER
Patient called and stated [facility] [place] [place] does not have the Wegovy and patient would like this now sent to [facility] on [address]; patient states if they don't have it he will call back on Tuesday

## 2023-05-30 RX ORDER — SEMAGLUTIDE 0.25 MG/.5ML
0.25 INJECTION, SOLUTION SUBCUTANEOUS WEEKLY
Qty: 2 ML | Refills: 3 | Status: SHIPPED | OUTPATIENT
Start: 2023-05-30

## 2023-05-30 NOTE — TELEPHONE ENCOUNTER
Patient called and stated [facility] needs a PA on his Wegovy medication. Call back [phone]

## 2023-05-30 NOTE — TELEPHONE ENCOUNTER
Spoke to pt and made aware that Cover my meds will not let me submit it. Informed that per [facility] auth he will need to go through referring [facility] for all [facility] which [facility] be [facility] out of [place]. Pt voiced understanding and requested it be sent to them.

## 2023-05-31 ENCOUNTER — TELEPHONE (OUTPATIENT)
Dept: FAMILY MEDICINE CLINIC | Facility: CLINIC | Age: 58
End: 2023-05-31

## 2023-05-31 NOTE — TELEPHONE ENCOUNTER
Called patient to let him know that thought the [facility] eye exams are self refer only and he would have to call [phone] to get that appointment scheduled.

## 2023-05-31 NOTE — TELEPHONE ENCOUNTER
Patient returned call. Informed patient of number to call for eye exam. Patient voiced understanding.

## 2023-06-08 ENCOUNTER — LAB (OUTPATIENT)
Dept: LAB | Facility: HOSPITAL | Age: 58
End: 2023-06-08
Payer: OTHER GOVERNMENT

## 2023-06-08 DIAGNOSIS — E78.2 MIXED HYPERLIPIDEMIA: ICD-10-CM

## 2023-06-08 DIAGNOSIS — I10 ESSENTIAL HYPERTENSION: ICD-10-CM

## 2023-06-08 LAB
ALBUMIN SERPL-MCNC: 4.1 G/DL (ref 3.5–5.2)
ALBUMIN UR-MCNC: <1.2 MG/DL
ALBUMIN/GLOB SERPL: 1.3 G/DL
ALP SERPL-CCNC: 89 U/L (ref 39–117)
ALT SERPL W P-5'-P-CCNC: 32 U/L (ref 1–41)
ANION GAP SERPL CALCULATED.3IONS-SCNC: 9 MMOL/L (ref 5–15)
AST SERPL-CCNC: 22 U/L (ref 1–40)
BASOPHILS # BLD MANUAL: 0.13 10*3/MM3 (ref 0–0.2)
BASOPHILS NFR BLD MANUAL: 2 % (ref 0–1.5)
BILIRUB SERPL-MCNC: 1.1 MG/DL (ref 0–1.2)
BUN SERPL-MCNC: 13 MG/DL (ref 6–20)
BUN/CREAT SERPL: 16.3 (ref 7–25)
CALCIUM SPEC-SCNC: 9 MG/DL (ref 8.6–10.5)
CHLORIDE SERPL-SCNC: 106 MMOL/L (ref 98–107)
CHOLEST SERPL-MCNC: 125 MG/DL (ref 0–200)
CO2 SERPL-SCNC: 24 MMOL/L (ref 22–29)
CREAT SERPL-MCNC: 0.8 MG/DL (ref 0.76–1.27)
CREAT UR-MCNC: 111.3 MG/DL
DEPRECATED RDW RBC AUTO: 36.7 FL (ref 37–54)
EGFRCR SERPLBLD CKD-EPI 2021: 101.9 ML/MIN/1.73
EOSINOPHIL # BLD MANUAL: 0.07 10*3/MM3 (ref 0–0.4)
EOSINOPHIL NFR BLD MANUAL: 1 % (ref 0.3–6.2)
ERYTHROCYTE [DISTWIDTH] IN BLOOD BY AUTOMATED COUNT: 13.9 % (ref 12.3–15.4)
GLOBULIN UR ELPH-MCNC: 3.2 GM/DL
GLUCOSE SERPL-MCNC: 105 MG/DL (ref 65–99)
HBA1C MFR BLD: 6.2 % (ref 4.8–5.6)
HCT VFR BLD AUTO: 40.9 % (ref 37.5–51)
HDLC SERPL-MCNC: 41 MG/DL (ref 40–60)
HGB BLD-MCNC: 13.7 G/DL (ref 13–17.7)
LDLC SERPL CALC-MCNC: 62 MG/DL (ref 0–100)
LDLC/HDLC SERPL: 1.45 {RATIO}
LYMPHOCYTES # BLD MANUAL: 3.03 10*3/MM3 (ref 0.7–3.1)
LYMPHOCYTES NFR BLD MANUAL: 10 % (ref 5–12)
MCH RBC QN AUTO: 24.9 PG (ref 26.6–33)
MCHC RBC AUTO-ENTMCNC: 33.5 G/DL (ref 31.5–35.7)
MCV RBC AUTO: 74.2 FL (ref 79–97)
MICROALBUMIN/CREAT UR: NORMAL MG/G{CREAT}
MONOCYTES # BLD: 0.67 10*3/MM3 (ref 0.1–0.9)
NEUTROPHILS # BLD AUTO: 2.83 10*3/MM3 (ref 1.7–7)
NEUTROPHILS NFR BLD MANUAL: 42 % (ref 42.7–76)
NRBC BLD AUTO-RTO: 0 /100 WBC (ref 0–0.2)
PLAT MORPH BLD: NORMAL
PLATELET # BLD AUTO: 260 10*3/MM3 (ref 140–450)
PMV BLD AUTO: 10.4 FL (ref 6–12)
POTASSIUM SERPL-SCNC: 4.2 MMOL/L (ref 3.5–5.2)
PROT SERPL-MCNC: 7.3 G/DL (ref 6–8.5)
RBC # BLD AUTO: 5.51 10*6/MM3 (ref 4.14–5.8)
RBC MORPH BLD: NORMAL
SODIUM SERPL-SCNC: 139 MMOL/L (ref 136–145)
TRIGL SERPL-MCNC: 122 MG/DL (ref 0–150)
VARIANT LYMPHS NFR BLD MANUAL: 45 % (ref 19.6–45.3)
VLDLC SERPL-MCNC: 22 MG/DL (ref 5–40)
WBC MORPH BLD: NORMAL
WBC NRBC COR # BLD: 6.74 10*3/MM3 (ref 3.4–10.8)

## 2023-06-08 PROCEDURE — 85025 COMPLETE CBC W/AUTO DIFF WBC: CPT

## 2023-06-08 PROCEDURE — 82570 ASSAY OF URINE CREATININE: CPT

## 2023-06-08 PROCEDURE — 80061 LIPID PANEL: CPT

## 2023-06-08 PROCEDURE — 83036 HEMOGLOBIN GLYCOSYLATED A1C: CPT

## 2023-06-08 PROCEDURE — 82043 UR ALBUMIN QUANTITATIVE: CPT

## 2023-06-08 PROCEDURE — 80053 COMPREHEN METABOLIC PANEL: CPT

## 2023-06-28 NOTE — PROGRESS NOTES
Subjective:  Donaldo Reis is a 58 y.o. male who presents for       Patient Active Problem List   Diagnosis    Elevated blood pressure reading    Chronic pain of right ankle    Right leg swelling    Arthritis of right ankle    Closed avulsion fracture of ankle with delayed healing    Controlled type 2 diabetes mellitus with complication, with long-term current use of insulin    Mixed hyperlipidemia    Leukocytosis    Essential hypertension    Class 2 severe obesity with serious comorbidity and body mass index (BMI) of 39.0 to 39.9 in adult    Controlled type 2 diabetes mellitus without complication, without long-term current use of insulin    Encounter for weight loss counseling    Encounter for diabetes type 2 eye exam    Class 2 severe obesity with serious comorbidity and body mass index (BMI) of 38.0 to 38.9 in adult    Controlled type 2 diabetes mellitus without complication, with long-term current use of insulin           Current Outpatient Medications:     atorvastatin (Lipitor) 20 MG tablet, Take 1 tablet by mouth Daily., Disp: 90 tablet, Rfl: 1    Insulin Glargine (LANTUS SOLOSTAR) 100 UNIT/ML injection pen, Inject 55 Units under the skin into the appropriate area as directed Daily., Disp: 9 mL, Rfl: 3    Insulin Pen Needle 32G X 8 MM misc, 1 application Daily., Disp: 100 each, Rfl: 12    losartan (Cozaar) 50 MG tablet, Take 1 tablet by mouth Daily., Disp: 90 tablet, Rfl: 1    metFORMIN (GLUCOPHAGE) 500 MG tablet, Take 1 tablet by mouth 2 (Two) Times a Day With Meals., Disp: 180 tablet, Rfl: 1    Semaglutide-Weight Management (Wegovy) 0.25 MG/0.5ML solution auto-injector, Inject 0.25 mg under the skin into the appropriate area as directed 1 (One) Time Per Week., Disp: 2 mL, Rfl: 3       PT is 59 yo male with management of obesity, DM type 2, sp appendectomy, PTSD, HLP, HTN    2/22/23 in office visit for recheck. Pt is due for new labwork, BP better controlled with losartan 50 mg daily. Pt is doing well no  chest pain no dizziness. He continues to take long acting insulin 55 units at bedtime.      8/11/23 in office visit for recheck. Pt had labwork on 6/8/23 that showed stable lipid panel hga1c is at 6.20. CMP showed glucose at 105 liver enzymes stable kidney function stable CBC showed stable hemoglobin and WBC.  Cologuard test has yet to be completed.  Pt was started on wegovy 0.25 mg injection weekly.  He did not start it due to an insurance issue.  His weight is stable.       Hypertension  This is a chronic problem. The current episode started more than 1 year ago. The problem has been gradually improving since onset. The problem is uncontrolled. Pertinent negatives include no chest pain, headaches or shortness of breath. Past treatments include angiotensin blockers. Current antihypertension treatment includes angiotensin blockers. The current treatment provides no improvement. There are no compliance problems.  There is no history of angina, kidney disease, CAD/MI, CVA, heart failure, left ventricular hypertrophy, PVD or retinopathy. There is no history of chronic renal disease, coarctation of the aorta, hyperaldosteronism, a hypertension causing med, pheochromocytoma or renovascular disease.   Obesity  This is a chronic problem. The current episode started more than 1 year ago. The problem occurs constantly. The problem has been unchanged. Associated symptoms include arthralgias, fatigue, numbness and weakness. Pertinent negatives include no chest pain, chills, congestion, coughing, diaphoresis, fever, headaches, nausea, sore throat or vomiting. Nothing aggravates the symptoms. He has tried nothing for the symptoms. The treatment provided no relief.   Diabetes  He presents for his follow-up diabetic visit. He has type 2 diabetes mellitus. Pertinent negatives for hypoglycemia include no confusion, dizziness, headaches, hunger, mood changes, nervousness/anxiousness, pallor, seizures, sleepiness or sweats. Associated  symptoms include fatigue and weakness. Pertinent negatives for diabetes include no chest pain. Pertinent negatives for hypoglycemia complications include no blackouts, no hospitalization, no nocturnal hypoglycemia, no required assistance and no required glucagon injection. Pertinent negatives for diabetic complications include no autonomic neuropathy, CVA, heart disease, impotence, nephropathy, peripheral neuropathy, PVD or retinopathy. Risk factors for coronary artery disease include diabetes mellitus, male sex and obesity. Current diabetic treatment includes insulin injections and oral agent (monotherapy). He is compliant with treatment most of the time. His weight is stable. He is following a generally unhealthy diet. When asked about meal planning, he reported none. He has not had a previous visit with a dietitian. An ACE inhibitor/angiotensin II receptor blocker is not being taken. He does not see a podiatrist.Eye exam is not current.   Leg Swelling  This is a recurrent problem. The current episode started more than 1 month ago. The problem occurs constantly. The problem has been unchanged. Associated symptoms include arthralgias, fatigue, numbness and weakness. Pertinent negatives include no chest pain, chills, congestion, coughing, diaphoresis, fever, headaches, nausea, sore throat or vomiting. Nothing aggravates the symptoms. He has tried nothing for the symptoms. The treatment provided no relief.   Ankle Pain   The incident occurred more than 1 week ago. The incident occurred at home. The injury mechanism was a twisting injury. The pain is present in the right ankle. The quality of the pain is described as aching. The pain is at a severity of 4/10. The pain is moderate. Associated symptoms include numbness. Pertinent negatives include no inability to bear weight, loss of motion, loss of sensation, muscle weakness or tingling. He reports no foreign bodies present. The symptoms are aggravated by movement and  palpation. He has tried nothing for the symptoms. The treatment provided no relief.       Review of Systems  Review of Systems   Constitutional:  Positive for activity change and fatigue. Negative for appetite change, chills, diaphoresis and fever.   HENT:  Negative for congestion, postnasal drip, rhinorrhea, sinus pressure, sinus pain, sneezing, sore throat, trouble swallowing and voice change.    Respiratory:  Negative for cough, choking, chest tightness, shortness of breath, wheezing and stridor.    Cardiovascular:  Negative for chest pain.   Gastrointestinal:  Negative for diarrhea, nausea and vomiting.   Musculoskeletal:  Positive for arthralgias. Negative for back pain.   Neurological:  Positive for weakness and numbness. Negative for headaches.     Patient Active Problem List   Diagnosis    Elevated blood pressure reading    Chronic pain of right ankle    Right leg swelling    Arthritis of right ankle    Closed avulsion fracture of ankle with delayed healing    Controlled type 2 diabetes mellitus with complication, with long-term current use of insulin    Mixed hyperlipidemia    Leukocytosis    Essential hypertension    Class 2 severe obesity with serious comorbidity and body mass index (BMI) of 39.0 to 39.9 in adult    Controlled type 2 diabetes mellitus without complication, without long-term current use of insulin    Encounter for weight loss counseling    Encounter for diabetes type 2 eye exam    Class 2 severe obesity with serious comorbidity and body mass index (BMI) of 38.0 to 38.9 in adult    Controlled type 2 diabetes mellitus without complication, with long-term current use of insulin     Past Surgical History:   Procedure Laterality Date    APPENDECTOMY       Social History     Socioeconomic History    Marital status: Legally    Tobacco Use    Smoking status: Never    Smokeless tobacco: Never   Vaping Use    Vaping Use: Never used   Substance and Sexual Activity    Alcohol use: Yes    Drug  use: Never    Sexual activity: Defer     Family History   Problem Relation Age of Onset    Cancer Brother      Lab on 06/08/2023   Component Date Value Ref Range Status    WBC 06/08/2023 6.74  3.40 - 10.80 10*3/mm3 Final    RBC 06/08/2023 5.51  4.14 - 5.80 10*6/mm3 Final    Hemoglobin 06/08/2023 13.7  13.0 - 17.7 g/dL Final    Hematocrit 06/08/2023 40.9  37.5 - 51.0 % Final    MCV 06/08/2023 74.2 (L)  79.0 - 97.0 fL Final    MCH 06/08/2023 24.9 (L)  26.6 - 33.0 pg Final    MCHC 06/08/2023 33.5  31.5 - 35.7 g/dL Final    RDW 06/08/2023 13.9  12.3 - 15.4 % Final    RDW-SD 06/08/2023 36.7 (L)  37.0 - 54.0 fl Final    MPV 06/08/2023 10.4  6.0 - 12.0 fL Final    Platelets 06/08/2023 260  140 - 450 10*3/mm3 Final    nRBC 06/08/2023 0.0  0.0 - 0.2 /100 WBC Final    Glucose 06/08/2023 105 (H)  65 - 99 mg/dL Final    BUN 06/08/2023 13  6 - 20 mg/dL Final    Creatinine 06/08/2023 0.80  0.76 - 1.27 mg/dL Final    Sodium 06/08/2023 139  136 - 145 mmol/L Final    Potassium 06/08/2023 4.2  3.5 - 5.2 mmol/L Final    Chloride 06/08/2023 106  98 - 107 mmol/L Final    CO2 06/08/2023 24.0  22.0 - 29.0 mmol/L Final    Calcium 06/08/2023 9.0  8.6 - 10.5 mg/dL Final    Total Protein 06/08/2023 7.3  6.0 - 8.5 g/dL Final    Albumin 06/08/2023 4.1  3.5 - 5.2 g/dL Final    ALT (SGPT) 06/08/2023 32  1 - 41 U/L Final    AST (SGOT) 06/08/2023 22  1 - 40 U/L Final    Alkaline Phosphatase 06/08/2023 89  39 - 117 U/L Final    Total Bilirubin 06/08/2023 1.1  0.0 - 1.2 mg/dL Final    Globulin 06/08/2023 3.2  gm/dL Final    A/G Ratio 06/08/2023 1.3  g/dL Final    BUN/Creatinine Ratio 06/08/2023 16.3  7.0 - 25.0 Final    Anion Gap 06/08/2023 9.0  5.0 - 15.0 mmol/L Final    eGFR 06/08/2023 101.9  >60.0 mL/min/1.73 Final    Hemoglobin A1C 06/08/2023 6.20 (H)  4.80 - 5.60 % Final    Total Cholesterol 06/08/2023 125  0 - 200 mg/dL Final    Triglycerides 06/08/2023 122  0 - 150 mg/dL Final    HDL Cholesterol 06/08/2023 41  40 - 60 mg/dL Final    LDL  Cholesterol  06/08/2023 62  0 - 100 mg/dL Final    VLDL Cholesterol 06/08/2023 22  5 - 40 mg/dL Final    LDL/HDL Ratio 06/08/2023 1.45   Final    Microalbumin/Creatinine Ratio 06/08/2023    Final    Unable to calculate    Creatinine, Urine 06/08/2023 111.3  mg/dL Final    Microalbumin, Urine 06/08/2023 <1.2  mg/dL Final    Neutrophil % 06/08/2023 42.0 (L)  42.7 - 76.0 % Final    Lymphocyte % 06/08/2023 45.0  19.6 - 45.3 % Final    Monocyte % 06/08/2023 10.0  5.0 - 12.0 % Final    Eosinophil % 06/08/2023 1.0  0.3 - 6.2 % Final    Basophil % 06/08/2023 2.0 (H)  0.0 - 1.5 % Final    Neutrophils Absolute 06/08/2023 2.83  1.70 - 7.00 10*3/mm3 Final    Lymphocytes Absolute 06/08/2023 3.03  0.70 - 3.10 10*3/mm3 Final    Monocytes Absolute 06/08/2023 0.67  0.10 - 0.90 10*3/mm3 Final    Eosinophils Absolute 06/08/2023 0.07  0.00 - 0.40 10*3/mm3 Final    Basophils Absolute 06/08/2023 0.13  0.00 - 0.20 10*3/mm3 Final    RBC Morphology 06/08/2023 Normal  Normal Final    WBC Morphology 06/08/2023 Normal  Normal Final    Platelet Morphology 06/08/2023 Normal  Normal Final      XR Ankle 3+ View Right  Narrative: Three view right ankle    HISTORY: Pain    AP, lateral and oblique views obtained.    COMPARISON: None    FINDINGS:   No acute fracture or dislocation.  8 mm and smaller old avulsions tip of the medial malleolus.  Small spur posterior aspect distal tibia.  Moderate-sized calcaneal spurs.  Minimal degenerative change base of the fifth metatarsal.  No other osseous or articular abnormality.  Impression: CONCLUSION:  8 mm and smaller old avulsions tip of the medial malleolus.  Small spur posterior aspect distal tibia.  Moderate-sized calcaneal spurs.  Minimal degenerative change base of the fifth metatarsal.    [id number]    Electronically signed by:  Art Agrawal MD  10/27/2022 10:50 PM  CDT Workstation: [id number]    @[email or web address]@  Immunization History   Administered Date(s) Administered    COVID-19 (MODERNA) 1st,2nd,3rd Dose  "Monovalent 04/18/2022    Tdap 10/30/2018       The following portions of the patient's history were reviewed and updated as appropriate: allergies, current medications, past family history, past medical history, past social history, past surgical history and problem list.        Physical Exam  /84 (BP Location: Right arm, Patient Position: Sitting, Cuff Size: Large Adult)   Pulse 77   Temp 97.7 øF (36.5 øC) (Temporal)   Ht 193 cm (75.98\")   Wt (!) 146 kg (321 lb)   SpO2 98%   BMI 39.09 kg/mý       Physical Exam  Vitals and nursing note reviewed.   Constitutional:       Appearance: He is well-developed. He is not diaphoretic.   HENT:      Head: Normocephalic and atraumatic.      Right Ear: External ear normal.   Eyes:      Conjunctiva/sclera: Conjunctivae normal.      Pupils: Pupils are equal, round, and reactive to light.   Cardiovascular:      Rate and Rhythm: Normal rate and regular rhythm.      Heart sounds: Normal heart sounds. No murmur heard.  Pulmonary:      Effort: Pulmonary effort is normal. No respiratory distress.      Breath sounds: Normal breath sounds.   Abdominal:      General: Bowel sounds are normal. There is no distension.      Palpations: Abdomen is soft.      Tenderness: There is no abdominal tenderness.      Comments: Obese abdomen    Musculoskeletal:         General: Tenderness present. No deformity. Normal range of motion.      Cervical back: Normal range of motion and neck supple.   Skin:     General: Skin is warm.      Coloration: Skin is not pale.      Findings: No erythema or rash.   Neurological:      Mental Status: He is alert and oriented to person, place, and time.      Cranial Nerves: No cranial nerve deficit.   Psychiatric:         Behavior: Behavior normal.       [unfilled]   Diagnosis Plan   1. Class 2 severe obesity with serious comorbidity and body mass index (BMI) of 38.0 to 38.9 in adult, unspecified obesity type  CBC Auto Differential    Comprehensive " Metabolic Panel    Hemoglobin A1c    Lipid Panel      2. Essential hypertension  CBC Auto Differential    Comprehensive Metabolic Panel    Hemoglobin A1c    Lipid Panel      3. Mixed hyperlipidemia  CBC Auto Differential    Comprehensive Metabolic Panel    Hemoglobin A1c    Lipid Panel      4. Encounter for diabetes type 2 eye exam  Ambulatory Referral for Diabetic Eye Exam-Ophthalmology    CBC Auto Differential    Comprehensive Metabolic Panel    Hemoglobin A1c    Lipid Panel      5. Encounter for weight loss counseling  CBC Auto Differential    Comprehensive Metabolic Panel    Hemoglobin A1c    Lipid Panel      6. Special screening for malignant neoplasms, colon  Cologuard - Stool, Per Rectum      7. Controlled type 2 diabetes mellitus without complication, with long-term current use of insulin             -went over labwork   -recommend diabetic eye exam- -refer to DR. Hernandes.    -recommend colonoscopy screening - will get cologuard.    -HTN - on losartan 50 mg daily.   -leukocytosis - continue to monitor   -HLP - -recommend heart healthy diet. on lipitor 20 mg PO qhs. Drug information provided.   -right ankle pain/right leg swelling/old avulsion fracture of right ankle. Arthritis of right ankle  - reviewed x-ray of ankle. Referred to Podiatry  -obesity  -counseled weight loss >5 minutes bMI at 39.09 was on n wegovy injection 0.25 mg weekly.   -Dm type 2 -  On Latnus  55 units at bedtime   and metformin 500 mg pO BID.  May need to adjust insulin   -advised pt to be safe and call with questions and concerns  -advised pt to go to ER or call 911 if symptoms worrisome or severe  -advised pt to followup with specialist and referrals  -advised pt to be safe during COVID-19 pandemic  I spent 34  minutes caring for Donaldo on this date of service. This time includes time spent by me in the following activities: preparing for the visit, reviewing tests, obtaining and/or reviewing a separately obtained history, performing a  medically appropriate examination and/or evaluation, counseling and educating the patient/family/caregiver, ordering medications, tests, or procedures, referring and communicating with other health care professionals, documenting information in the medical record, independently interpreting results and communicating that information with the patient/family/caregiver and care coordination.   -recheck in 2 months        This document has been electronically signed by Josh Michel MD on August 18, 2023 12:59 CDT

## 2023-08-11 ENCOUNTER — OFFICE VISIT (OUTPATIENT)
Dept: FAMILY MEDICINE CLINIC | Facility: CLINIC | Age: 58
End: 2023-08-11
Payer: OTHER GOVERNMENT

## 2023-08-11 VITALS
DIASTOLIC BLOOD PRESSURE: 84 MMHG | OXYGEN SATURATION: 98 % | BODY MASS INDEX: 38.36 KG/M2 | TEMPERATURE: 97.7 F | HEIGHT: 76 IN | SYSTOLIC BLOOD PRESSURE: 138 MMHG | HEART RATE: 77 BPM | WEIGHT: 315 LBS

## 2023-08-11 DIAGNOSIS — I10 ESSENTIAL HYPERTENSION: ICD-10-CM

## 2023-08-11 DIAGNOSIS — E11.9 ENCOUNTER FOR DIABETES TYPE 2 EYE EXAM: ICD-10-CM

## 2023-08-11 DIAGNOSIS — E11.9 CONTROLLED TYPE 2 DIABETES MELLITUS WITHOUT COMPLICATION, WITHOUT LONG-TERM CURRENT USE OF INSULIN: ICD-10-CM

## 2023-08-11 DIAGNOSIS — Z12.11 SPECIAL SCREENING FOR MALIGNANT NEOPLASMS, COLON: Primary | ICD-10-CM

## 2023-08-11 DIAGNOSIS — E66.01 CLASS 2 SEVERE OBESITY WITH SERIOUS COMORBIDITY AND BODY MASS INDEX (BMI) OF 38.0 TO 38.9 IN ADULT, UNSPECIFIED OBESITY TYPE: ICD-10-CM

## 2023-08-11 DIAGNOSIS — Z71.3 ENCOUNTER FOR WEIGHT LOSS COUNSELING: ICD-10-CM

## 2023-08-11 DIAGNOSIS — E78.2 MIXED HYPERLIPIDEMIA: ICD-10-CM

## 2023-08-11 DIAGNOSIS — Z01.00 ENCOUNTER FOR DIABETES TYPE 2 EYE EXAM: ICD-10-CM

## 2023-08-11 RX ORDER — SEMAGLUTIDE 0.25 MG/.5ML
0.25 INJECTION, SOLUTION SUBCUTANEOUS WEEKLY
Qty: 2 ML | Refills: 3 | Status: SHIPPED | OUTPATIENT
Start: 2023-08-11

## 2023-08-11 NOTE — PATIENT INSTRUCTIONS
Dr. Hernandes for diabetic eye exam  Get cologuard test      Get labwork next month    Wegovy shot to [facility]

## 2023-08-18 PROBLEM — E11.9 CONTROLLED TYPE 2 DIABETES MELLITUS WITHOUT COMPLICATION, WITH LONG-TERM CURRENT USE OF INSULIN: Status: ACTIVE | Noted: 2023-08-18

## 2023-08-18 PROBLEM — Z79.4 CONTROLLED TYPE 2 DIABETES MELLITUS WITHOUT COMPLICATION, WITH LONG-TERM CURRENT USE OF INSULIN: Status: ACTIVE | Noted: 2023-08-18

## 2023-08-23 ENCOUNTER — PRIOR AUTHORIZATION (OUTPATIENT)
Dept: FAMILY MEDICINE CLINIC | Facility: CLINIC | Age: 58
End: 2023-08-23
Payer: OTHER GOVERNMENT

## 2023-08-23 NOTE — TELEPHONE ENCOUNTER
Wegovy auth denied by [employer] because patient has not been on trulicity. Already talked to patient about the denial.

## 2023-09-07 ENCOUNTER — PRIOR AUTHORIZATION (OUTPATIENT)
Dept: FAMILY MEDICINE CLINIC | Facility: CLINIC | Age: 58
End: 2023-09-07
Payer: OTHER GOVERNMENT

## 2023-09-07 RX ORDER — ATORVASTATIN CALCIUM 20 MG/1
20 TABLET, FILM COATED ORAL DAILY
Qty: 90 TABLET | Refills: 1 | Status: SHIPPED | OUTPATIENT
Start: 2023-09-07 | End: 2023-09-11 | Stop reason: SDUPTHER

## 2023-09-07 RX ORDER — LOSARTAN POTASSIUM 50 MG/1
50 TABLET ORAL DAILY
Qty: 90 TABLET | Refills: 1 | Status: SHIPPED | OUTPATIENT
Start: 2023-09-07 | End: 2023-09-11 | Stop reason: SDUPTHER

## 2023-09-08 ENCOUNTER — TELEPHONE (OUTPATIENT)
Dept: FAMILY MEDICINE CLINIC | Facility: CLINIC | Age: 58
End: 2023-09-08

## 2023-09-08 NOTE — TELEPHONE ENCOUNTER
[facility] said that their digital system is down and patient is needing to have prescriptions faxed in that was electronically sent in on 9/7/2023. Their fax number is [phone].    Patients return number is [phone]

## 2023-09-11 RX ORDER — LOSARTAN POTASSIUM 50 MG/1
50 TABLET ORAL DAILY
Qty: 90 TABLET | Refills: 1 | Status: SHIPPED | OUTPATIENT
Start: 2023-09-11

## 2023-09-11 RX ORDER — ATORVASTATIN CALCIUM 20 MG/1
20 TABLET, FILM COATED ORAL DAILY
Qty: 90 TABLET | Refills: 1 | Status: SHIPPED | OUTPATIENT
Start: 2023-09-11

## 2023-09-11 NOTE — TELEPHONE ENCOUNTER
Pt states he requested meds be sent to [facility] but unfortunately they would not fill the medication due to it not being prescribed by [facility]. Pt requested it be sent to [facility] instead.

## 2024-04-29 NOTE — PATIENT INSTRUCTIONS
Get labwork and x-ray of ankle    Will get Ultrasound of right leg    Get blood pressure machine and check BP at home in morning and afternoon. Goal <130/90    Recheck in 2 weeks    Will call results      
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